# Patient Record
Sex: FEMALE | Race: WHITE | NOT HISPANIC OR LATINO | Employment: FULL TIME | URBAN - METROPOLITAN AREA
[De-identification: names, ages, dates, MRNs, and addresses within clinical notes are randomized per-mention and may not be internally consistent; named-entity substitution may affect disease eponyms.]

---

## 2020-03-14 ENCOUNTER — VIRTUAL VISIT (OUTPATIENT)
Dept: FAMILY MEDICINE | Facility: OTHER | Age: 40
End: 2020-03-14

## 2020-03-20 NOTE — PROGRESS NOTES
"Date: 2020 17:19:57  Clinician: Cris Saucedo  Clinician NPI: [id number]  Patient: Savannah Loja  Patient : 1980  Patient Address: [address] [place] [address]  Patient Phone: [phone]  Visit Protocol: URI  Patient Summary:  Savannah is a 39 year old ( : 1980 ) female who initiated a Visit for cold, sinus infection, or influenza. When asked the question \"Please sign me up to receive news, health information and promotions from [employer].\", Savannah responded \"No\".    Savannah states her symptoms started gradually 3-6 days ago.   Her symptoms consist of malaise, rhinitis, chills, a sore throat, a cough, and nasal congestion. She is experiencing difficulty breathing due to nasal congestion but she is not short of breath.   Symptom details     Nasal secretions: The color of her mucus is yellow.    Cough: Savannah coughs a few times an hour and her cough is more bothersome at night. Phlegm comes into her throat when she coughs. She believes her cough is caused by post-nasal drip. The color of the phlegm is yellow.     Sore throat: Savannah reports having mild throat pain (1-3 on a 10 point pain scale), does not have exudate on her tonsils, and can swallow liquids. She is not sure if the lymph nodes in her neck are enlarged. A rash has not appeared on the skin since the sore throat started.      Savannah denies having ear pain, facial pain or pressure, myalgias, wheezing, teeth pain, fever, and headache. She also denies double sickening (worsening symptoms after initial improvement), taking antibiotic medication for the symptoms, and having recent facial or sinus surgery in the past 60 days.   Precipitating events  Savannah is not sure if she has been exposed to someone with strep throat. She has not recently been exposed to someone with influenza. Savannah has not been in close contact with any high risk individuals.   Pertinent COVID-19 (Coronavirus) information  Savannah has not traveled " internationally or to the areas where COVID-19 (Coronavirus) is widespread in the last 14 days before the start of her symptoms.   Savannah has not had close contact with a suspected or laboratory-confirmed COVID-19 patient within 14 days of symptom onset.   Savannah is not a healthcare worker and does not work in a healthcare facility.   Pertinent medical history  Savannah typically gets a yeast infection when she takes antibiotics. She is not sure if she has used fluconazole (Diflucan) to treat previous yeast infections.   Savannah needs a return to work/school note.   Weight: 135 lbs   Savannah does not smoke or use smokeless tobacco.   She denies pregnancy and denies breastfeeding. She does not menstruate.   Weight: 135 lbs    MEDICATIONS: No current medications, ALLERGIES: NKDA  Clinician Response:  Dear Savannah,  Based on the information provided, you have a viral upper respiratory infection, otherwise known as a cold. Symptoms vary from person to person, but can include sneezing, coughing, a runny nose, sore throat, and headache and range from mild to severe.  Unfortunately, there are no medications that can cure a cold, so treatment is focused on controlling symptoms as much as possible. Most people gradually feel better until symptoms are gone in 1-2 weeks.  Medication information  Because you have a viral infection, antibiotics will not help you get better. Treating a viral infection with antibiotics could actually make you feel worse.  For more information on why I am not prescribing antibiotics, please watch this video: Antibiotics Aren't Always the Answer.  Unless you are allergic to the over-the-counter medication(s) below, I recommend using:     An antihistamine such as Benadryl, Claritin, or store brand.    A decongestant such as Sudafed PE or store brand.     Over-the-counter medications do not require a prescription. Ask the pharmacist if you have any questions.  Self care  The following tips will keep you as  comfortable as possible while you recover:     Rest    Drink plenty of water and other liquids    Take a hot shower to loosen congestion    Use throat lozenges    Gargle with warm salt water (1/4 teaspoon of salt per 8 ounce glass of water)    Suck on frozen items such as popsicles or ice cubes    Drink hot tea with lemon and honey    Take a spoonful of honey to reduce your cough     When to seek care  Please be seen in a clinic or urgent care if new symptoms develop, or symptoms become worse.  Call 911 or go to the emergency room if you feel that your throat is closing off, you suddenly develop a rash, you are unable to swallow fluids, you are drooling, or you are having difficulty breathing.  Additional treatment plan   Dear Savannah,  Based on the information you have provided, it does not appear you need Coronavirus (COVID-19) testing.   At this time, we recommend testing primarily for those people who have symptoms of cough and fever and have either traveled to a known area of infection or have been exposed to someone with laboratory confirmed Coronavirus by close contact.   Coronavirus - General Information:   The coronavirus infection starts within 14 days of an exposure.  Symptoms are those of a respiratory infection (such as fever, cough).   If you have not had symptoms by day 15, you should be considered uninfected by coronavirus.   Coronavirus - Symptoms:    The coronavirus can cause a respiratory illness, such as bronchitis or pneumonia.  The most common symptoms are: cough, fever, and shortness of breath.   Other symptoms are: body aches, chills, diarrhea, fatigue, headache, runny nose, and sore throat   Coronavirus - Exposure Risk Factors:   Exposure to a person who has been diagnosed with coronavirus.  Travel from an area with recent local transmission of coronavirus.  The CDC (www.cdc.gov) has the most up-to-date list of where the coronavirus outbreak is occurring.   Coronavirus - Spreading:    The  virus likely spreads through respiratory droplets produced when a person coughs or sneezes. These respiratory droplets can travel approximately 6 feet and can remain on surfaces. Common disinfectants will kill the virus.  The CDC currently does not recommend healthy people wear masks.   Coronavirus - Protect Yourself:    Avoid close contact with people known to have this new coronavirus infection.  Wash hands often with soap and water or alcohol-based hand .  Avoid touching the eyes, nose or mouth.   Thank you for limiting contact with others, wearing a simple mask to cover your cough, practice good hand hygiene habits and accessing our virtual services where possible to limit the spread of this virus.  For more information about COVID19 and options for caring for yourself at home, please visit the CDC website at https://www.cdc.gov/coronavirus/2019-ncov/about/steps-when-sick.html   For more options for care at [facility], please visit our website at [email or web address]     Diagnosis: Cough  Diagnosis ICD: R05

## 2020-11-16 ENCOUNTER — OFFICE VISIT (OUTPATIENT)
Dept: OBGYN | Facility: CLINIC | Age: 40
End: 2020-11-16
Payer: COMMERCIAL

## 2020-11-16 VITALS
HEIGHT: 68 IN | DIASTOLIC BLOOD PRESSURE: 64 MMHG | BODY MASS INDEX: 21.22 KG/M2 | WEIGHT: 140 LBS | SYSTOLIC BLOOD PRESSURE: 112 MMHG | TEMPERATURE: 97.9 F | HEART RATE: 73 BPM

## 2020-11-16 DIAGNOSIS — Z30.432 ENCOUNTER FOR IUD REMOVAL: ICD-10-CM

## 2020-11-16 DIAGNOSIS — Z30.430 ENCOUNTER FOR IUD INSERTION: ICD-10-CM

## 2020-11-16 DIAGNOSIS — Z12.4 SCREENING FOR MALIGNANT NEOPLASM OF CERVIX: Primary | ICD-10-CM

## 2020-11-16 PROCEDURE — 58301 REMOVE INTRAUTERINE DEVICE: CPT | Performed by: OBSTETRICS & GYNECOLOGY

## 2020-11-16 PROCEDURE — 87624 HPV HI-RISK TYP POOLED RSLT: CPT | Performed by: OBSTETRICS & GYNECOLOGY

## 2020-11-16 PROCEDURE — G0145 SCR C/V CYTO,THINLAYER,RESCR: HCPCS | Performed by: OBSTETRICS & GYNECOLOGY

## 2020-11-16 PROCEDURE — 58300 INSERT INTRAUTERINE DEVICE: CPT | Performed by: OBSTETRICS & GYNECOLOGY

## 2020-11-16 ASSESSMENT — MIFFLIN-ST. JEOR: SCORE: 1343.54

## 2020-11-16 NOTE — LETTER
November 23, 2020      Savannah Loja  [address] [facility] [address]        Dear ,    We are happy to inform you that your recent Pap smear and Human Papillomavirus (HPV) test results are normal and negative.    It is recommended that you have your next Pap smear and Human Papillomavirus (HPV) test in 5 years. You will also need to return to the clinic every year for an annual wellness visit.    If you have additional questions regarding this result, please contact our office and we will be happy to assist you.      Sincerely,    Your [facility] Care Team

## 2020-11-16 NOTE — PROGRESS NOTES
"GYN CLINIC VISIT  2020   CC: IUD replacement (removal and insertion)    HPI:   Savannah Loja is a 40 year old  who presents to clinic today for an IUD removal and replacement.  She had a Mirena inserted in  for contraception.   She desires removal of IUD because it is due for removal.   Her bleeding pattern with IUD is none.  H/o STIs: No. Current STI symptoms: No. Last pap: 5 years ago    Reviewed GYN hx, OB hx, past medical hx, surgical hx and family hx.   Reviewed medications and allergies. Changes made in EPIC.     OBJECTIVE:   Vitals:    20 1526   BP: 112/64   Pulse: 73   Temp: 97.9  F (36.6  C)   TempSrc: Oral   Weight: 63.5 kg (140 lb)   Height: 1.727 m (5' 8\")     Body mass index is 21.29 kg/m .     Gen: alert, oriented, no distress, cooperative and pleasant  Abd: soft, nontender, nondistended  : normal external genitalia without lesions or abnormalities. Normal Bartholin's, normal Aniak's, normal urethra. Normal vaginal mucosa, no abnormal discharge or lesions. Cervix appears normal, no lesions or erythema. Bimanual exam reveals 6 week sized anteverted mobile uterus, no cervical motion tenderness, no uterine tenderness, no adnexal masses.    PROCEDURE: IUD REMOVAL  Patient has verbalized understanding of risks and benefits. All questions answered. She has signed the consent form.      A medium graves speculum was placed in the vagina with good visualization of the cervix.  The IUD strings visualized at cervical os. IUD strings grasped with sterile ring forceps. Gentle traction applied and IUD removed intact without difficulty. There were no complications. The patient tolerated the procedure well.      PROCEDURE: IUD insertion  Patient has verbalized understanding of risks and benefits. She was counseled on risks of infection, bleeding, uterine perforation, cervical laceration, expulsion, overall risk of pregnancy 2 in 1000, if she does get pregnant that there is an increased risk of " ectopic pregnancy. All questions answered. She has signed the consent form.        After IUD removed, the cervix was then swabbed with a betadine x3.  Tenaculum was placed at the 12 o'clock position on the cervix and the uterus sounded to 8cm.  The Mirena  IUD was then placed in the usual fashion under sterile technique without difficulty.  Strings were clipped about 2-3 cm from the cervical os.  Tenaculum was removed and cervix was hemostatic. There were no complications. The patient tolerated the procedure well.  NDC:25004-503-61 LOT#:[id number] EXP:2023    ASSESSMENT:   Savannah Loja is a 40 year old  who had a Mirena IUD removed and a Mirena IUD replaced today without complication.    PLAN:  1. Screening for malignant neoplasm of cervix  - Pap imaged thin layer screen with HPV - recommended age 30 - 65 years (select HPV order below)  - HPV High Risk Types DNA Cervical    2. Encounter for IUD removal  - REMOVE INTRAUTERINE DEVICE    3. Encounter for IUD insertion  - levonorgestrel (MIRENA) 20 MCG/24HR IUD 20 mcg  - INSERTION INTRAUTERINE DEVICE  - LEVONORGESTREL (MIRENA) IU 52MG 5 YR      The patient should feel for the IUD strings in 2 weeks.  If unable to locate them, she should return to clinic for a speculum examination for confirmation that the IUD is in place. Bleeding pattern of this particular IUD was discussed with the patient. She is aware that the IUD will need to be removed in 6 years or PRN.  She is to return to clinic for her next annual or PRN.      MD Laura Watkins MD

## 2020-11-16 NOTE — NURSING NOTE
"Chief Complaint   Patient presents with     IUD     NDC:86023-885-52 LOT#:[id number] EXP:2023       Initial /64   Pulse 73   Temp 97.9  F (36.6  C) (Oral)   Ht 1.727 m (5' 8\")   Wt 63.5 kg (140 lb)   BMI 21.29 kg/m   Estimated body mass index is 21.29 kg/m  as calculated from the following:    Height as of this encounter: 1.727 m (5' 8\").    Weight as of this encounter: 63.5 kg (140 lb).  BP completed using cuff size: regular    Questioned patient about current smoking habits.  Pt. has never smoked.          The following HM Due: pap smear      The following patient reported/Care Every where data was sent to:  P ABSTRACT QUALITY INITIATIVES [53675]        patient has appointment for today              "

## 2020-11-18 LAB
COPATH REPORT: NORMAL
PAP: NORMAL

## 2020-11-19 LAB
FINAL DIAGNOSIS: NORMAL
HPV HR 12 DNA CVX QL NAA+PROBE: NEGATIVE
HPV16 DNA SPEC QL NAA+PROBE: NEGATIVE
HPV18 DNA SPEC QL NAA+PROBE: NEGATIVE
SPECIMEN DESCRIPTION: NORMAL
SPECIMEN SOURCE CVX/VAG CYTO: NORMAL

## 2022-04-19 ENCOUNTER — OFFICE VISIT (OUTPATIENT)
Dept: OBGYN | Facility: CLINIC | Age: 42
End: 2022-04-19
Payer: COMMERCIAL

## 2022-04-19 VITALS
WEIGHT: 139 LBS | HEART RATE: 74 BPM | SYSTOLIC BLOOD PRESSURE: 104 MMHG | BODY MASS INDEX: 21.13 KG/M2 | DIASTOLIC BLOOD PRESSURE: 65 MMHG | TEMPERATURE: 98.4 F

## 2022-04-19 DIAGNOSIS — N95.1 PERIMENOPAUSE: Primary | ICD-10-CM

## 2022-04-19 LAB
ESTRADIOL SERPL-MCNC: 79 PG/ML
FSH SERPL-ACNC: 4.4 IU/L
TSH SERPL DL<=0.005 MIU/L-ACNC: 1.35 MU/L (ref 0.4–4)

## 2022-04-19 PROCEDURE — 83001 ASSAY OF GONADOTROPIN (FSH): CPT | Performed by: OBSTETRICS & GYNECOLOGY

## 2022-04-19 PROCEDURE — 82670 ASSAY OF TOTAL ESTRADIOL: CPT | Performed by: OBSTETRICS & GYNECOLOGY

## 2022-04-19 PROCEDURE — 99213 OFFICE O/P EST LOW 20 MIN: CPT | Performed by: OBSTETRICS & GYNECOLOGY

## 2022-04-19 PROCEDURE — 36415 COLL VENOUS BLD VENIPUNCTURE: CPT | Performed by: OBSTETRICS & GYNECOLOGY

## 2022-04-19 PROCEDURE — 84443 ASSAY THYROID STIM HORMONE: CPT | Performed by: OBSTETRICS & GYNECOLOGY

## 2022-04-19 RX ORDER — MULTIPLE VITAMINS W/ MINERALS TAB 9MG-400MCG
1 TAB ORAL DAILY
COMMUNITY
End: 2024-07-08

## 2022-04-19 ASSESSMENT — ANXIETY QUESTIONNAIRES
5. BEING SO RESTLESS THAT IT IS HARD TO SIT STILL: NOT AT ALL
6. BECOMING EASILY ANNOYED OR IRRITABLE: SEVERAL DAYS
IF YOU CHECKED OFF ANY PROBLEMS ON THIS QUESTIONNAIRE, HOW DIFFICULT HAVE THESE PROBLEMS MADE IT FOR YOU TO DO YOUR WORK, TAKE CARE OF THINGS AT HOME, OR GET ALONG WITH OTHER PEOPLE: NOT DIFFICULT AT ALL
GAD7 TOTAL SCORE: 6
1. FEELING NERVOUS, ANXIOUS, OR ON EDGE: SEVERAL DAYS
3. WORRYING TOO MUCH ABOUT DIFFERENT THINGS: SEVERAL DAYS
2. NOT BEING ABLE TO STOP OR CONTROL WORRYING: SEVERAL DAYS
7. FEELING AFRAID AS IF SOMETHING AWFUL MIGHT HAPPEN: SEVERAL DAYS

## 2022-04-19 ASSESSMENT — PATIENT HEALTH QUESTIONNAIRE - PHQ9
5. POOR APPETITE OR OVEREATING: SEVERAL DAYS
SUM OF ALL RESPONSES TO PHQ QUESTIONS 1-9: 1

## 2022-04-19 NOTE — PROGRESS NOTES
GYN Clinic Visit  Date of visit: 2022   Chief Complaint: hormone check    HPI:   Savannah Loja is a 41 year old  female who presents to discuss mood changes and hormonal concerns.    Mood changes for past 4 months.  Does not feel like herself. PCP gave her rx for selective serotonin reuptake inhibitor but she prefers to solve it without pharmaceuticals. She does meditation and has a therapist. Is it COVID fatigue? Work/life stress? Perimenopause?    Works as an . Kids are 8 and 10. No new stresses. No thoughts of harming self or others.  Mirena in place. No periods.   Sometimes bloating, intermittent back pain. Headaches. No hot flushes or night sweats. Younger sister. Mother was in late 40s when she went through menopause    PHQ 2022   PHQ-9 Total Score 1   Q9: Thoughts of better off dead/self-harm past 2 weeks Not at all     SHAUNA-7 SCORE 2022   Total Score 6       Obstetric History:   OB History    Para Term  AB Living   2 0 0 0 0 2   SAB IAB Ectopic Multiple Live Births   0 0 0 0 2      # Outcome Date GA Lbr Jace/2nd Weight Sex Delivery Anes PTL Lv   2      F Vag-Spont EPI  RAISA   1      M Vag-Spont EPI  RAISA       Past Medical History:  No past medical history on file.    Past Surgical History:  No past surgical history on file.      Medications:  Current Outpatient Medications   Medication     multivitamin w/minerals (MULTI-VITAMIN) tablet     cholecalciferol 50 MCG ( UT) CAPS     No current facility-administered medications for this visit.       Allergy:  No Known Allergies  Patient denies food, latex or environmental allergies.     Social History:  Works as an   Lives with  Pepe and 2 kids  Feels safe  Rare EtOH, maybe a few a month  No tobacco  No drugs    Family History   Problem Relation Age of Onset     Cancer Maternal Grandfather      Depression Paternal Grandmother      Migraines Paternal Grandmother      Cancer Paternal  Grandmother         breast cancer         Physical Exam:  Vitals:    22 0905   BP: 104/65   Pulse: 74   Temp: 98.4  F (36.9  C)   Weight: 63 kg (139 lb)     Body mass index is 21.13 kg/m .    Gen: healthy, alert, active, no distress        Assessment:  Savannah Loja is a 41 year old  who presents to discuss mood changes and possible perimenopause.     Plan:  1. Perimenopause  Will check hormones to determine menopausal status.   Discussed hormones can fluctuate in our 40s, that mood issues can be multi-factorial.  - Follicle stimulating hormone; Future  - Estradiol; Future  - TSH with free T4 reflex; Future  - Follicle stimulating hormone  - Estradiol  - TSH with free T4 reflex    Total time including prep time day of service, visit time with the patient, coordination of care and post visit work including documentation time: 20 min      Laura Newton MD

## 2022-04-20 ASSESSMENT — ANXIETY QUESTIONNAIRES: GAD7 TOTAL SCORE: 6

## 2022-05-22 ENCOUNTER — HEALTH MAINTENANCE LETTER (OUTPATIENT)
Age: 42
End: 2022-05-22

## 2022-09-24 ENCOUNTER — HEALTH MAINTENANCE LETTER (OUTPATIENT)
Age: 42
End: 2022-09-24

## 2023-01-29 ENCOUNTER — HEALTH MAINTENANCE LETTER (OUTPATIENT)
Age: 43
End: 2023-01-29

## 2023-12-17 ENCOUNTER — HEALTH MAINTENANCE LETTER (OUTPATIENT)
Age: 43
End: 2023-12-17

## 2024-02-25 ENCOUNTER — HEALTH MAINTENANCE LETTER (OUTPATIENT)
Age: 44
End: 2024-02-25

## 2024-05-05 ENCOUNTER — HEALTH MAINTENANCE LETTER (OUTPATIENT)
Age: 44
End: 2024-05-05

## 2024-07-08 ENCOUNTER — OFFICE VISIT (OUTPATIENT)
Dept: OBGYN | Facility: CLINIC | Age: 44
End: 2024-07-08
Payer: COMMERCIAL

## 2024-07-08 VITALS
OXYGEN SATURATION: 96 % | SYSTOLIC BLOOD PRESSURE: 95 MMHG | WEIGHT: 145.3 LBS | DIASTOLIC BLOOD PRESSURE: 62 MMHG | HEIGHT: 69 IN | HEART RATE: 68 BPM | BODY MASS INDEX: 21.52 KG/M2

## 2024-07-08 DIAGNOSIS — Z30.431 IUD CHECK UP: ICD-10-CM

## 2024-07-08 DIAGNOSIS — Z01.419 ENCOUNTER FOR GYNECOLOGICAL EXAMINATION WITHOUT ABNORMAL FINDING: Primary | ICD-10-CM

## 2024-07-08 DIAGNOSIS — N95.1 PERIMENOPAUSAL SYMPTOMS: ICD-10-CM

## 2024-07-08 PROCEDURE — 99396 PREV VISIT EST AGE 40-64: CPT | Performed by: OBSTETRICS & GYNECOLOGY

## 2024-07-08 PROCEDURE — 99459 PELVIC EXAMINATION: CPT | Performed by: OBSTETRICS & GYNECOLOGY

## 2024-07-08 ASSESSMENT — ANXIETY QUESTIONNAIRES
1. FEELING NERVOUS, ANXIOUS, OR ON EDGE: NOT AT ALL
7. FEELING AFRAID AS IF SOMETHING AWFUL MIGHT HAPPEN: SEVERAL DAYS
5. BEING SO RESTLESS THAT IT IS HARD TO SIT STILL: NOT AT ALL
GAD7 TOTAL SCORE: 3
3. WORRYING TOO MUCH ABOUT DIFFERENT THINGS: NOT AT ALL
6. BECOMING EASILY ANNOYED OR IRRITABLE: MORE THAN HALF THE DAYS
2. NOT BEING ABLE TO STOP OR CONTROL WORRYING: NOT AT ALL
IF YOU CHECKED OFF ANY PROBLEMS ON THIS QUESTIONNAIRE, HOW DIFFICULT HAVE THESE PROBLEMS MADE IT FOR YOU TO DO YOUR WORK, TAKE CARE OF THINGS AT HOME, OR GET ALONG WITH OTHER PEOPLE: NOT DIFFICULT AT ALL
GAD7 TOTAL SCORE: 3

## 2024-07-08 ASSESSMENT — PATIENT HEALTH QUESTIONNAIRE - PHQ9
5. POOR APPETITE OR OVEREATING: NOT AT ALL
SUM OF ALL RESPONSES TO PHQ QUESTIONS 1-9: 1

## 2024-07-08 NOTE — PROGRESS NOTES
"GYN CLINIC VISIT  2024  CC: annual exam    HPI:  Savannah is a 43 year old  female who presents for annual exam.     Menses are nonexistent due to Mirena IUD (inserted ). Sometimes random light spotting.   Menses flow: light and spotty.  No LMP recorded (lmp unknown). (Menstrual status: IUD).. Using IUD for contraception.  She is not currently considering pregnancy.  Besides routine health maintenance,  she would like to discuss perimenopause, IUD, and hormones.  - strength training, taking magnesium before bed. No hotflushes or nightsweats.  - more irriability. Prefers no selective serotonin reuptake inhibitor.   - [facility] spring [facility]  GYNECOLOGIC HISTORY:  Menarche: 11  Age at first intercourse: 18 Number of lifetime partners: >6  Savannah is sexually active with 1 male partner(s) and is currently in monogamous relationship with spouse.    History sexually transmitted infections:No STD history and unsure about HPV.  STI testing offered?  Declined  MART exposure: No  History of abnormal Pap smear: No  Family history of breast CA: Yes (Please explain): Paternal grandmother, maternal aunt  Family history of uterine/ovarian CA: No    Family history of colon CA: No    HEALTH MAINTENANCE:  Cholesterol: (No results found for: \"CHOL\" History of abnormal lipids: No  Mammo: Yes . History of abnormal Mammo: Had something removed.  Regular Self Breast Exams: No  Calcium/Vitamin D intake: source:  dairy, dietary supplement(s) Adequate? Yes  TSH: (  TSH   Date Value Ref Range Status   2022 1.35 0.40 - 4.00 mU/L Final    )  Pap; (  Lab Results   Component Value Date    PAP NIL 2020    )    HISTORY:  OB History    Para Term  AB Living   2 0 0 0 0 2   SAB IAB Ectopic Multiple Live Births   0 0 0 0 2      # Outcome Date GA Lbr Jace/2nd Weight Sex Type Anes PTL Lv   2      F Vag-Spont EPI  RAISA   1      M Vag-Spont EPI  RAISA     No past medical history on " file.    No past surgical history on file.    Family History   Problem Relation Age of Onset    Cancer Maternal Grandfather     Depression Paternal Grandmother     Migraines Paternal Grandmother     Cancer Paternal Grandmother         breast cancer     Social History     Socioeconomic History    Marital status:      Spouse name: None    Number of children: None    Years of education: None    Highest education level: None   Tobacco Use    Smoking status: Never    Smokeless tobacco: Never   Vaping Use    Vaping status: Never Used   Substance and Sexual Activity    Alcohol use: Yes    Drug use: Never    Sexual activity: Yes     Partners: Male     Birth control/protection: I.U.D.       Current Outpatient Medications:     CALCIUM-VITAMIN D-VITAMIN K PO, Take 1 tablet by mouth daily, Disp: , Rfl:     COLLAGEN PO, Take 2 Tablespoonful by mouth daily, Disp: , Rfl:     MAGNESIUM PO, Take 1 tablet by mouth daily, Disp: , Rfl:      Allergies   Allergen Reactions    Cats Itching     Mostly eyes    Seasonal Allergies Other (See Comments)     Itchy and watery eyes, stuffy nose, congestion, etc.        Past medical, surgical, social and family history were reviewed and updated in EPIC.    ROS:   C:     NEGATIVE for fever, chills, change in weight  I:       NEGATIVE for worrisome rashes, moles or lesions  E:     NEGATIVE for vision changes or irritation  E/M: NEGATIVE for ear, mouth and throat problems  R:     NEGATIVE for significant cough or SOB  CV:   NEGATIVE for chest pain, palpitations or peripheral edema  GI:     NEGATIVE for nausea, abdominal pain, heartburn, or change in bowel habits  :   NEGATIVE for frequency, dysuria, hematuria, vaginal discharge, or irregular bleeding  M:     NEGATIVE for significant arthralgias or myalgia  N:      NEGATIVE for weakness, dizziness or paresthesias  E:      NEGATIVE for temperature intolerance, skin/hair changes  P:      NEGATIVE for changes in mood or affect.    EXAM:  BP 95/62  "  Pulse 68   Ht 1.753 m (5' 9\")   Wt 65.9 kg (145 lb 4.8 oz)   LMP  (LMP Unknown)   SpO2 96%   BMI 21.46 kg/m     BMI: Body mass index is 21.46 kg/m .  Constitutional: healthy, alert and no distress  Head: Normocephalic. No masses, lesions, tenderness or abnormalities  Neck: Neck supple. Trachea midline. No adenopathy. Thyroid symmetric, normal size.   Cardiovascular: RRR.   Respiratory: clear bilaterally.   Breast: Breasts reveal mild symmetric fibrocystic densities, but there are no dominant, discrete, fixed or suspicious masses found.  Gastrointestinal: Abdomen soft, non-tender, non-distended. No masses, organomegaly.  :  Vulva:  No external lesions, normal female hair distribution, no inguinal adenopathy.    Urethra:  Midline, non-tender, well supported, no discharge  Vagina:  Moist, pink, no abnormal discharge, no lesions  Cervix: normal appearing, IUD strings visible  Uterus:  Normal size, anteverted , non-tender, freely mobile  Ovaries:  No masses appreciated, non-tender, mobile  Rectal Exam: deferred  Musculoskeletal: extremities normal  Skin: multiple nevi. no suspicious lesions or rashes  Psychiatric: Affect appropriate, cooperative,mentation appears normal.     COUNSELING:   Reviewed preventive health counseling, as reflected in patient instructions       Regular exercise       Healthy diet/nutrition       Contraception       Colorectal Cancer Screening       (Claire)menopause management   reports that she has never smoked. She has never used smokeless tobacco.    Body mass index is 21.46 kg/m .    FRAX Risk Assessment    ASSESSMENT:  43 year old female with satisfactory annual exam    1. Encounter for gynecological examination without abnormal finding  Due for pap in 2025  - [place] PELVIC EXAMINATION    2. Perimenopausal symptoms  Discussed perimenopausal symptoms  Discussed HRT and topical vaginal estrogen  Discussed selective serotonin reuptake inhibitors and therapy for mood changes  Will think " about it and encouraged her to send me a message if questions or new sx    3. IUD check up    - [place] PELVIC EXAMINATION      Laura Newton MD

## 2025-01-23 ENCOUNTER — TRANSFERRED RECORDS (OUTPATIENT)
Dept: HEALTH INFORMATION MANAGEMENT | Facility: CLINIC | Age: 45
End: 2025-01-23
Payer: COMMERCIAL

## 2025-02-04 ENCOUNTER — MYC MEDICAL ADVICE (OUTPATIENT)
Dept: OBGYN | Facility: CLINIC | Age: 45
End: 2025-02-04
Payer: COMMERCIAL

## 2025-02-04 NOTE — TELEPHONE ENCOUNTER
Patient had breast MRI done at [facility].   Report uploaded to her chart on 2/1/25 in Media tab.    Routing to MD to review, patient states she has not been told anything about it/results.     Sandra JACOBO RN   [facility] RN

## 2025-02-05 NOTE — TELEPHONE ENCOUNTER
I reviewed results - good news.   No evidence of malignancy in either breast. No additional testing needed. Recommend routine screening - for her alternate 3d mammo with MRI q6mo.     Right: BIRADS 1 - negative  Left: BIRADS 2 - benign    Please relay info to patient.  Thanks  Laura Newton MD

## 2025-04-22 ENCOUNTER — VIRTUAL VISIT (OUTPATIENT)
Dept: OBGYN | Facility: CLINIC | Age: 45
End: 2025-04-22
Payer: COMMERCIAL

## 2025-04-22 DIAGNOSIS — N95.1 PERIMENOPAUSAL SYMPTOMS: Primary | ICD-10-CM

## 2025-04-22 PROCEDURE — 98005 SYNCH AUDIO-VIDEO EST LOW 20: CPT | Performed by: OBSTETRICS & GYNECOLOGY

## 2025-04-22 RX ORDER — ESTRADIOL 0.05 MG/D
1 PATCH, EXTENDED RELEASE TRANSDERMAL
Qty: 24 PATCH | Refills: 3 | Status: SHIPPED | OUTPATIENT
Start: 2025-04-24

## 2025-04-22 ASSESSMENT — ANXIETY QUESTIONNAIRES
3. WORRYING TOO MUCH ABOUT DIFFERENT THINGS: NOT AT ALL
8. IF YOU CHECKED OFF ANY PROBLEMS, HOW DIFFICULT HAVE THESE MADE IT FOR YOU TO DO YOUR WORK, TAKE CARE OF THINGS AT HOME, OR GET ALONG WITH OTHER PEOPLE?: NOT DIFFICULT AT ALL
6. BECOMING EASILY ANNOYED OR IRRITABLE: NEARLY EVERY DAY
5. BEING SO RESTLESS THAT IT IS HARD TO SIT STILL: NOT AT ALL
GAD7 TOTAL SCORE: 3
7. FEELING AFRAID AS IF SOMETHING AWFUL MIGHT HAPPEN: NOT AT ALL
2. NOT BEING ABLE TO STOP OR CONTROL WORRYING: NOT AT ALL
GAD7 TOTAL SCORE: 3
IF YOU CHECKED OFF ANY PROBLEMS ON THIS QUESTIONNAIRE, HOW DIFFICULT HAVE THESE PROBLEMS MADE IT FOR YOU TO DO YOUR WORK, TAKE CARE OF THINGS AT HOME, OR GET ALONG WITH OTHER PEOPLE: NOT DIFFICULT AT ALL
1. FEELING NERVOUS, ANXIOUS, OR ON EDGE: NOT AT ALL
4. TROUBLE RELAXING: NOT AT ALL

## 2025-04-22 NOTE — PROGRESS NOTES
Savannah is a 44 year old who is being evaluated via a billable video visit.    How would you like to obtain your AVS? MyChart  If the video visit is dropped, the invitation should be resent by: Text to cell phone: [phone]  Will anyone else be joining your video visit? No      Assessment & Plan       Assessment:  Savannah Loja is a 44 year old  who presents in consultation for bothersome symptoms in the menopausal transition.     Plan:  1. Perimenopausal symptoms (Primary)    - estradiol (VIVELLE-DOT) 0.05 MG/24HR bi-weekly patch; Place 1 patch over 96 hours onto the skin twice a week.  Dispense: 24 patch; Refill: 3      Discussed that systemic hormone therapy, with estrogen alone or in combination with progestin, is the most effective therapy for vasomotor symptoms related to menopause. Discussed risks of hormone therapy including thromboembolic disease and breast cancer. Discussed additional benefit of osteoporosis prevention. As patient has a uterus, discussed importance of progesterone component to protect uterine lining - will use Mirena IUD, discussed it is off label use and unclear how long it provides effective protection (5-8yrs?). Discussed if increase in bleeding after starting patch I would recommend to replace it as it has been 5yr already.  Reviewed side effects including breast tenderness, vaginal bleeding, bloating, and headaches. Patient does not have any contraindications. Discussed recommendation of using lowest dose for shortest time needed to relieve symptoms. Patient is  interested in hormone therapy, will do transdermal estrogen due to lower VTE risk. Plan to re-evaluate in a few months when she is due for her annual and pap.   As an alternative or adjunct to hormone therapy, discussed that antidepressant agents SSRIs and SSNRIs are effective for the treatment of vasomotor symptoms associated with menopause. Additionally discussed that gabapentin is an anticonvulsant agent that has  been shown to reduce vasomotor symptoms in several studies but is not FDA-approved for this indication. Discussed Veozah is FDA approved for vasomotor sx.         Laura Newton MD                  Kimani Nuñez is a 44 year old, presenting for the following health issues:perimenopause    HPI      She complains of moodiness or irritability and decreased mental sharpness. Most bothersome to her are headaches, fatigue, rage, cognitive fog    Lifting weights  Taking supplements  No alcohol  Sleep is okay  No hot flushes. Gets warm at night but no dramatic sweats  Some joint pain    Mirena IUD - no bleeding. Just random spotting    No LMP recorded. (Menstrual status: IUD)..    History of breast cancer: no  History of coronary heart disease: no  History of venous thromboembolic event or stroke: no  Active liver disease: no    Due to pap this year      Obstetric History:   OB History    Para Term  AB Living   2 0 0 0 0 2   SAB IAB Ectopic Multiple Live Births   0 0 0 0 2      # Outcome Date GA Lbr Jace/2nd Weight Sex Type Anes PTL Lv   2      F Vag-Spont EPI  RAISA   1      M Vag-Spont EPI  RAISA         Past Medical History:  No past medical history on file.    Past Surgical History:  No past surgical history on file.      Medications:  Current Outpatient Medications   Medication Sig Dispense Refill    CALCIUM-VITAMIN D-VITAMIN K PO Take 1 tablet by mouth daily      COLLAGEN PO Take 2 Tablespoonful by mouth daily      MAGNESIUM PO Take 1 tablet by mouth daily       No current facility-administered medications for this visit.               Objective           Vitals:  No vitals were obtained today due to virtual visit.    Physical Exam   GENERAL: alert and no distress  EYES: Eyes grossly normal to inspection.  No discharge or erythema, or obvious scleral/conjunctival abnormalities.  RESP: No audible wheeze, cough, or visible cyanosis.    SKIN: Visible skin clear. No significant rash,  abnormal pigmentation or lesions.  NEURO: Cranial nerves grossly intact.  Mentation and speech appropriate for age.  PSYCH: Appropriate affect, tone, and pace of words          Video-Visit Details    Type of service:  Video Visit   Originating Location (pt. Location): Home    Distant Location (provider location):  On-site  Platform used for Video Visit: Deepak    Joined the call at 4/22/2025, 11:44:21 am.  Left the call at 4/22/2025, 12:08:00 pm.  You were on the call for 23 minutes 39 seconds.  Signed Electronically by: Laura Newton MD     Answers submitted by the patient for this visit:  Patient Health Questionnaire (G7) (Submitted on 4/22/2025)  SHAUNA 7 TOTAL SCORE: 3

## 2025-05-15 ENCOUNTER — MYC MEDICAL ADVICE (OUTPATIENT)
Dept: OBGYN | Facility: CLINIC | Age: 45
End: 2025-05-15

## 2025-05-15 DIAGNOSIS — N95.1 PERIMENOPAUSAL SYMPTOMS: Primary | ICD-10-CM

## 2025-05-15 RX ORDER — ESTRADIOL 0.05 MG/D
1 PATCH TRANSDERMAL WEEKLY
Qty: 12 PATCH | Refills: 3 | Status: SHIPPED | OUTPATIENT
Start: 2025-05-15 | End: 2025-08-27

## 2025-08-27 RX ORDER — ESTRADIOL 0.05 MG/D
1 PATCH TRANSDERMAL WEEKLY
Qty: 12 PATCH | Refills: 2 | Status: SHIPPED | OUTPATIENT
Start: 2025-08-27